# Patient Record
Sex: FEMALE | Race: WHITE | NOT HISPANIC OR LATINO | URBAN - METROPOLITAN AREA
[De-identification: names, ages, dates, MRNs, and addresses within clinical notes are randomized per-mention and may not be internally consistent; named-entity substitution may affect disease eponyms.]

---

## 2025-04-20 ENCOUNTER — HOSPITAL ENCOUNTER (EMERGENCY)
Facility: HOSPITAL | Age: 55
Discharge: HOME | End: 2025-04-20
Attending: EMERGENCY MEDICINE
Payer: COMMERCIAL

## 2025-04-20 VITALS
BODY MASS INDEX: 31.34 KG/M2 | TEMPERATURE: 97.2 F | RESPIRATION RATE: 16 BRPM | HEIGHT: 66 IN | WEIGHT: 195 LBS | SYSTOLIC BLOOD PRESSURE: 177 MMHG | HEART RATE: 71 BPM | OXYGEN SATURATION: 98 % | DIASTOLIC BLOOD PRESSURE: 94 MMHG

## 2025-04-20 DIAGNOSIS — S61.012A LACERATION OF LEFT THUMB WITHOUT FOREIGN BODY WITHOUT DAMAGE TO NAIL, INITIAL ENCOUNTER: Primary | ICD-10-CM

## 2025-04-20 PROCEDURE — 99283 EMERGENCY DEPT VISIT LOW MDM: CPT | Performed by: EMERGENCY MEDICINE

## 2025-04-20 PROCEDURE — 99281 EMR DPT VST MAYX REQ PHY/QHP: CPT | Performed by: EMERGENCY MEDICINE

## 2025-04-20 PROCEDURE — 99282 EMERGENCY DEPT VISIT SF MDM: CPT

## 2025-04-20 RX ORDER — BACITRACIN ZINC 500 UNIT/G
OINTMENT IN PACKET (EA) TOPICAL ONCE
Status: DISCONTINUED | OUTPATIENT
Start: 2025-04-20 | End: 2025-04-20

## 2025-04-20 ASSESSMENT — LIFESTYLE VARIABLES
EVER FELT BAD OR GUILTY ABOUT YOUR DRINKING: NO
HAVE YOU EVER FELT YOU SHOULD CUT DOWN ON YOUR DRINKING: NO
HAVE PEOPLE ANNOYED YOU BY CRITICIZING YOUR DRINKING: NO
TOTAL SCORE: 0
EVER HAD A DRINK FIRST THING IN THE MORNING TO STEADY YOUR NERVES TO GET RID OF A HANGOVER: NO

## 2025-04-20 ASSESSMENT — COLUMBIA-SUICIDE SEVERITY RATING SCALE - C-SSRS
6. HAVE YOU EVER DONE ANYTHING, STARTED TO DO ANYTHING, OR PREPARED TO DO ANYTHING TO END YOUR LIFE?: NO
2. HAVE YOU ACTUALLY HAD ANY THOUGHTS OF KILLING YOURSELF?: NO
1. IN THE PAST MONTH, HAVE YOU WISHED YOU WERE DEAD OR WISHED YOU COULD GO TO SLEEP AND NOT WAKE UP?: NO

## 2025-04-20 ASSESSMENT — PAIN - FUNCTIONAL ASSESSMENT: PAIN_FUNCTIONAL_ASSESSMENT: 0-10

## 2025-04-20 ASSESSMENT — PAIN SCALES - GENERAL: PAINLEVEL_OUTOF10: 2

## 2025-04-21 NOTE — ED PROVIDER NOTES
EMERGENCY DEPARTMENT ENCOUNTER      Pt Name: Milka Santroo  MRN: [id number]  Birthdate 1970  Date of evaluation: 4/20/2025  Provider: Layla Elaine MD    CHIEF COMPLAINT       Chief Complaint   Patient presents with    Finger Laceration     Pt reports cutting apples when her left hand thumb was cut by the blade transverse. Pt presents with apprx 0.5cm avulsion w/o bleeding. MSP intact over the thumb         HISTORY OF PRESENT ILLNESS    A 54-year-old female presents to the ED with a small laceration to the left thumb.  The patient reports she was cutting apples at home with a kitchen knife and she accidentally injured the pad of her left thumb, resulting in bleeding.  She denies taking blood thinners, no numbness, tingling or loss of sensation, difficulty moving left hand fingers, including the thumb.  No other injuries and no other concerns.  Patient is right-handed and last tetanus vaccine administration in 2016.         History provided by:  Patient   used: No        Nursing Notes were reviewed.    PAST MEDICAL HISTORY   Medical History[1]      SURGICAL HISTORY     Surgical History[2]      CURRENT MEDICATIONS       There are no discharge medications for this patient.      ALLERGIES     Primidone and Meloxicam    FAMILY HISTORY     Family History[3]       SOCIAL HISTORY     Social History[4]    SCREENINGS                        PHYSICAL EXAM    (up to 7 for level 4, 8 or more for level 5)     ED Triage Vitals [04/20/25 2148]   Temperature Heart Rate Respirations BP   36.2 °C (97.2 °F) 71 16 (!) 177/94      Pulse Ox Temp Source Heart Rate Source Patient Position   98 % Temporal -- --      BP Location FiO2 (%)     Right arm --       Physical Exam  Vitals and nursing note reviewed.   Constitutional:       General: She is not in acute distress.     Appearance: Normal appearance. She is not ill-appearing.   Cardiovascular:      Rate and Rhythm: Normal rate and regular rhythm.       Pulses: Normal pulses.      Heart sounds: Normal heart sounds.   Pulmonary:      Effort: Pulmonary effort is normal.      Breath sounds: Normal breath sounds.   Musculoskeletal:         General: Signs of injury present. No swelling, tenderness or deformity. Normal range of motion.      Comments: Small (~0.3-0.5 cm) superficial laceration noted on the volar and lateral aspect of the left thumb pad without involvement of the nail.  Good capillary refill and sensation intact.  Left radial pulse palpated and  full range of motion preserved, including flexion, extension, abduction, abduction and left thumb opposition.  No active bleeding and no signs of tendon involvement or foreign body.  No obvious deformities noted.     Skin:     General: Skin is warm and dry.      Capillary Refill: Capillary refill takes less than 2 seconds.      Findings: Lesion present. No bruising or erythema.   Neurological:      General: No focal deficit present.      Mental Status: She is alert and oriented to person, place, and time.          DIAGNOSTIC RESULTS     LABS:  Labs Reviewed - No data to display    All other labs were within normal range or not returned as of this dictation.    Imaging  No orders to display        Procedures  Procedures     EMERGENCY DEPARTMENT COURSE/MDM:     ED Course as of 04/21/25 1552   Sun Apr 20, 2025 2228 Small superficial laceration to the lateral aspect of the thumb.  No nailbed involvement.  No joint space involvement.  Wound appears clean.  Will wash it out apply Dermabond.  Patient is given expectant instructions. [JJ]      ED Course User Index  [JJ] Kale Wilcox DO         Diagnoses as of 04/21/25 1552   Laceration of left thumb without foreign body without damage to nail, initial encounter        Medical Decision Making  A 54-year-old female presents to the ED for very small (~0.3-0.5 cm), superficial laceration noted on the volar and lateral aspect of the left thumb pad without involvement of the  nail.  Laceration is clean and neurovascular is intact. No active bleeding.  Patient refused pain meds.  Patient reports last tetanus vaccine in 2016.  She was offered tan update today but declined.  Wound was irrigated thoroughly with sterile normal saline.  No sutures were required. Dermal adhesive was applied successfully, and the area was covered with bulky dressing.  Patient was discharged home with PCP follow-up and return precautions.  Patient and or family in agreement and understanding of treatment plan.  All questions answered.      I reviewed the case with the attending ED physician. The attending ED physician agrees with the plan. Patient and/or patient´s representative was counseled regarding labs, imaging, likely diagnosis, and plan. All questions were answered.    ED Medications administered this visit:  Medications - No data to display    New Prescriptions from this visit:    There are no discharge medications for this patient.      Follow-up:  No follow-up provider specified.      Final Impression:   1. Laceration of left thumb without foreign body without damage to nail, initial encounter          (Please note that portions of this note were completed with a voice recognition program.  Efforts were made to edit the dictations but occasionally words are mis-transcribed.)         [1] No past medical history on file.  [2] No past surgical history on file.  [3] No family history on file.  [4]   Social History  Socioeconomic History    Marital status:      Social Drivers of Health     Financial Resource Strain: Medium Risk (12/10/2024)    Received from [facility]    Overall Financial Resource Strain (CARDIA)     Difficulty of Paying Living Expenses: Somewhat hard   Food Insecurity: Food Insecurity Present (12/10/2024)    Received from [facility]    Hunger Vital Sign     Worried About Running Out of Food in the Last Year: Sometimes true     Ran Out of Food in the Last Year: Sometimes  true   Transportation Needs: No Transportation Needs (12/10/2024)    Received from [facility]    PRAPARE - Transportation     Lack of Transportation (Medical): No     Lack of Transportation (Non-Medical): No   Physical Activity: Sufficiently Active (12/10/2024)    Received from [facility]    Exercise Vital Sign     Days of Exercise per Week: 2 days     Minutes of Exercise per Session: 120 min   Stress: Stress Concern Present (12/10/2024)    Received from [facility]    [employer] - Occupational Stress Questionnaire     Feeling of Stress : To some extent   Social Connections: Socially Isolated (12/10/2024)    Received from [facility]    Social Connection and Isolation Panel [NHANES]     Frequency of Communication with Friends and Family: More than three times a week     Frequency of Social Gatherings with Friends and Family: More than three times a week     Attends Orthodox Services: Never     Active Member of Clubs or Organizations: No     Attends Club or Organization Meetings: Never     Marital Status:    Housing Stability: Unknown (12/10/2024)    Received from [facility]    Housing Stability Vital Sign     Unable to Pay for Housing in the Last Year: Patient declined        Layla Elaine MD  Resident  04/21/25 1557

## 2025-04-21 NOTE — DISCHARGE INSTRUCTIONS
Please, follow-up with your PCP and keep the laceration clean and dry.  Return to the ED if pain out of proportion, fever, swelling, redness, numbness/tingling or loss of sensation, difficulty moving your fingers, new/worsening symptoms or any other concern.   glasses/Normal vision: sees adequately in most situations; can see medication labels, newsprint